# Patient Record
Sex: FEMALE | Race: WHITE | Employment: FULL TIME | URBAN - METROPOLITAN AREA
[De-identification: names, ages, dates, MRNs, and addresses within clinical notes are randomized per-mention and may not be internally consistent; named-entity substitution may affect disease eponyms.]

---

## 2017-05-08 ENCOUNTER — TRANSFERRED RECORDS (OUTPATIENT)
Dept: HEALTH INFORMATION MANAGEMENT | Facility: CLINIC | Age: 62
End: 2017-05-08

## 2017-08-05 ENCOUNTER — HEALTH MAINTENANCE LETTER (OUTPATIENT)
Age: 62
End: 2017-08-05

## 2017-11-20 ENCOUNTER — TRANSFERRED RECORDS (OUTPATIENT)
Dept: HEALTH INFORMATION MANAGEMENT | Facility: CLINIC | Age: 62
End: 2017-11-20

## 2018-10-06 ENCOUNTER — HEALTH MAINTENANCE LETTER (OUTPATIENT)
Age: 63
End: 2018-10-06

## 2018-12-10 ENCOUNTER — TRANSFERRED RECORDS (OUTPATIENT)
Dept: HEALTH INFORMATION MANAGEMENT | Facility: CLINIC | Age: 63
End: 2018-12-10

## 2019-06-17 ENCOUNTER — TRANSFERRED RECORDS (OUTPATIENT)
Dept: HEALTH INFORMATION MANAGEMENT | Facility: CLINIC | Age: 64
End: 2019-06-17

## 2019-10-02 ENCOUNTER — HEALTH MAINTENANCE LETTER (OUTPATIENT)
Age: 64
End: 2019-10-02

## 2019-11-05 ENCOUNTER — THERAPY VISIT (OUTPATIENT)
Dept: PHYSICAL THERAPY | Facility: CLINIC | Age: 64
End: 2019-11-05
Payer: COMMERCIAL

## 2019-11-05 DIAGNOSIS — M54.50 LEFT-SIDED LOW BACK PAIN WITHOUT SCIATICA: ICD-10-CM

## 2019-11-05 PROCEDURE — 97112 NEUROMUSCULAR REEDUCATION: CPT | Mod: GP | Performed by: PHYSICAL THERAPIST

## 2019-11-05 PROCEDURE — 97161 PT EVAL LOW COMPLEX 20 MIN: CPT | Mod: GP | Performed by: PHYSICAL THERAPIST

## 2019-11-05 PROCEDURE — 97110 THERAPEUTIC EXERCISES: CPT | Mod: GP | Performed by: PHYSICAL THERAPIST

## 2019-11-05 ASSESSMENT — ACTIVITIES OF DAILY LIVING (ADL)
SITTING_FOR_15_MINUTES: NO DIFFICULTY AT ALL
GOING_DOWN_1_FLIGHT_OF_STAIRS: NO DIFFICULTY AT ALL
STANDING_FOR_15_MINUTES: SLIGHT DIFFICULTY
RECREATIONAL_ACTIVITIES: SLIGHT DIFFICULTY
WALKING_INITIALLY: NO DIFFICULTY AT ALL
WALKING_15_MINUTES_OR_GREATER: SLIGHT DIFFICULTY
HOS_ADL_SCORE(%): 91.67
WALKING_APPROXIMATELY_10_MINUTES: NO DIFFICULTY AT ALL
LIGHT_TO_MODERATE_WORK: NO DIFFICULTY AT ALL
GETTING_INTO_AND_OUT_OF_AN_AVERAGE_CAR: NO DIFFICULTY AT ALL
GETTING_INTO_AND_OUT_OF_A_BATHTUB: NO DIFFICULTY AT ALL
ROLLING_OVER_IN_BED: NO DIFFICULTY AT ALL
HEAVY_WORK: SLIGHT DIFFICULTY
HOS_ADL_COUNT: 15
HOS_ADL_HIGHEST_POTENTIAL_SCORE: 60
TWISTING/PIVOTING_ON_INVOLVED_LEG: NO DIFFICULTY AT ALL
GOING_UP_1_FLIGHT_OF_STAIRS: NO DIFFICULTY AT ALL
HOS_ADL_ITEM_SCORE_TOTAL: 55
HOW_WOULD_YOU_RATE_YOUR_CURRENT_LEVEL_OF_FUNCTION_DURING_YOUR_USUAL_ACTIVITIES_OF_DAILY_LIVING_FROM_0_TO_100_WITH_100_BEING_YOUR_LEVEL_OF_FUNCTION_PRIOR_TO_YOUR_HIP_PROBLEM_AND_0_BEING_THE_INABILITY_TO_PERFORM_ANY_OF_YOUR_USUAL_DAILY_ACTIVITIES?: 90
DEEP_SQUATTING: SLIGHT DIFFICULTY
STEPPING_UP_AND_DOWN_CURBS: NO DIFFICULTY AT ALL
PUTTING_ON_SOCKS_AND_SHOES: NO DIFFICULTY AT ALL

## 2019-11-05 NOTE — LETTER
[facility] ATHLETIC [facility] PHYSICAL [facility]  [address] [address]  [phone]    2019    Re: Shahnaz Denson   :   1955  MRN:  [id number]   REFERRING PHYSICIAN:   OLIVIA Swanson CNP    [facility] ATHLETIC [facility] PHYSICAL [facility]    Date of Initial Evaluation:  19  Visits:  Rxs Used: 1  Reason for Referral:  Left-sided low back pain without sciatica        [facility] Initial Evaluation    Subjective:  Pt presents to PT with a chief complaint of L sided gluteal pain with reported onset in 2019. Pain worsened considerably with prolonged ambulation while on vacation in 10/2019. Primary pain location identified at R gluteals with radiation into R groin. Pt denies any radiating distal sxs or any numbness/tingling.     Shahnaz Denson being seen for low back and left hip/buttock pain.   Problem began 2019. Where condition occurred: for unknown reasons.Problem occurred: unknown  and reported as 2/10 on pain scale. General health as reported by patient is good. Pertinent medical history includes:  Thyroid problems.    Surgeries include:  Cancer surgery.  Current medications:  Thyroid medication.   Primary job tasks include:  Computer work and prolonged sitting.  Pain is described as aching and burning and is intermittent. Pain is the same all the time. Since onset symptoms are unchanged. Patient is . Restrictions include:  Working in normal job without restrictions.    Barriers include:  None as reported by patient.    Type of problem:  Lumbar (L sided)   Condition occurred with:  Insidious onset. This is a new condition   Problem details: 2019.   Patient reports pain:  Lower lumbar spine and lumbar spine left. Radiates to:  Gluteals left (groin). Associated symptoms:  Loss of motion/stiffness. Symptoms are exacerbated by standing, sitting and walking and relieved by rest and heat.    Lumbar/SI  Evaluation  ROM:    AROM Lumbar:   Flexion:           WNL  Ext:                    Min loss   Side Bend:        Left:  Min loss, pain +    Right:   Rotation:           Left:  WNL    Right:  WNL  Side Glide:        Left:     Right:   Re: Shahnaznayan Marteock   :   1955            Lumbar Myotomes:  normal    Lumbar Dermtomes:  normal    Neural Tension/Mobility:  Lumbar:  Normal      Lumbar Palpation:    Tenderness present at Left:    Piriformis    Spinal Segmental Conclusions:   Level: Hypo noted at L4 and L5    SI joint/Sacrum:      Left positive at:    Thigh thrust and Sacral thrust      Hip Evaluation    Hip Strength:    Extension:  Left: 5-/5  Pain:Right: 5-/5    Pain:    Abduction:  Left: 4+/5     Pain:Right: 5-/5    Pain:  Internal Rotation:  Left: 5/5    Pain:Right: 5/5   Pain:  External Rotation:  Left: 4+/5   Pain:  Right: 5-/5   Pain:    Assessment/Plan:    Patient is a 64 year old female with lumbar complaints.    Patient has the following significant findings with corresponding treatment plan.                Diagnosis 1:  L sided LBP  Pain -  manual therapy, splint/taping/bracing/orthotics, self management and education  Decreased ROM/flexibility - manual therapy and therapeutic exercise  Decreased joint mobility - manual therapy and therapeutic exercise  Decreased strength - therapeutic exercise and therapeutic activities  Impaired muscle performance - neuro re-education  Impaired posture - neuro re-education    Therapy Evaluation Codes:     Cumulative Therapy Evaluation is: Low complexity.  Previous and current functional limitations:  (See Goal Flow Sheet for this information)    Short term and Long term goals: (See Goal Flow Sheet for this information)     Communication ability:  Patient appears to be able to clearly communicate and understand verbal and written communication and follow directions correctly.  Treatment Explanation - The following has been discussed with the patient:   RX ordered/plan  of care  Anticipated outcomes  Possible risks and side effects  This patient would benefit from PT intervention to resume normal activities.   Rehab potential is good.  Re: Shahnaz Denson   :   1955        Frequency:  1 X week, once daily  Duration:  for 6 weeks  Discharge Plan:  Achieve all LTG.  Independent in home treatment program.  Reach maximal therapeutic benefit.    Please refer to the daily flowsheet for treatment today, total treatment time and time spent performing 1:1 timed codes.       Thank you for your referral.    INQUIRIES  Therapist: Boo Mclain DPT   [facility] PHYSICAL THERAPY  [address] [place] [address]  Phone: [phone]  Fax: [phone]

## 2019-11-05 NOTE — PROGRESS NOTES
[facility] Initial Evaluation  Subjective:  Pt presents to PT with a chief complaint of L sided gluteal pain with reported onset in 08/2019. Pain worsened considerably with prolonged ambulation while on vacation in 10/2019. Primary pain location identified at R gluteals with radiation into R groin. Pt denies any radiating distal sxs or any numbness/tingling.     Shahnaz Denson being seen for low back and left hip/buttock pain.   Problem began 8/1/2019. Where condition occurred: for unknown reasons.Problem occurred: unknown  and reported as 2/10 on pain scale. General health as reported by patient is good. Pertinent medical history includes:  Thyroid problems.    Surgeries include:  Cancer surgery.  Current medications:  Thyroid medication.   Primary job tasks include:  Computer work and prolonged sitting.  Pain is described as aching and burning and is intermittent. Pain is the same all the time. Since onset symptoms are unchanged.      Patient is . Restrictions include:  Working in normal job without restrictions.    Barriers include:  None as reported by patient.    Type of problem:  Lumbar (L sided)   Condition occurred with:  Insidious onset. This is a new condition   Problem details: 08/2019.   Patient reports pain:  Lower lumbar spine and lumbar spine left. Radiates to:  Gluteals left (groin). Associated symptoms:  Loss of motion/stiffness. Symptoms are exacerbated by standing, sitting and walking and relieved by rest and heat.                      Objective:  System         Lumbar/SI Evaluation  ROM:    AROM Lumbar:   Flexion:           WNL  Ext:                    Min loss   Side Bend:        Left:  Min loss, pain +    Right:   Rotation:           Left:  WNL    Right:  WNL  Side Glide:        Left:     Right:           Lumbar Myotomes:  normal                Lumbar Dermtomes:  normal                Neural Tension/Mobility:  Lumbar:  Normal        Lumbar Palpation:    Tenderness  present at Left:    Piriformis        Spinal Segmental Conclusions:     Level: Hypo noted at L4 and L5      SI joint/Sacrum:        Left positive at:    Thigh thrust and Sacral thrust                                        Hip Evaluation    Hip Strength:      Extension:  Left: 5-/5  Pain:Right: 5-/5    Pain:    Abduction:  Left: 4+/5     Pain:Right: 5-/5    Pain:    Internal Rotation:  Left: 5/5    Pain:Right: 5/5   Pain:  External Rotation:  Left: 4+/5   Pain:  Right: 5-/5   Pain:                           General     ROS    Assessment/Plan:    Patient is a 64 year old female with lumbar complaints.    Patient has the following significant findings with corresponding treatment plan.                Diagnosis 1:  L sided LBP  Pain -  manual therapy, splint/taping/bracing/orthotics, self management and education  Decreased ROM/flexibility - manual therapy and therapeutic exercise  Decreased joint mobility - manual therapy and therapeutic exercise  Decreased strength - therapeutic exercise and therapeutic activities  Impaired muscle performance - neuro re-education  Impaired posture - neuro re-education    Therapy Evaluation Codes:     Cumulative Therapy Evaluation is: Low complexity.    Previous and current functional limitations:  (See Goal Flow Sheet for this information)    Short term and Long term goals: (See Goal Flow Sheet for this information)     Communication ability:  Patient appears to be able to clearly communicate and understand verbal and written communication and follow directions correctly.  Treatment Explanation - The following has been discussed with the patient:   RX ordered/plan of care  Anticipated outcomes  Possible risks and side effects  This patient would benefit from PT intervention to resume normal activities.   Rehab potential is good.    Frequency:  1 X week, once daily  Duration:  for 6 weeks  Discharge Plan:  Achieve all LTG.  Independent in home treatment program.  Reach maximal  therapeutic benefit.    Please refer to the daily flowsheet for treatment today, total treatment time and time spent performing 1:1 timed codes.

## 2019-11-19 ENCOUNTER — THERAPY VISIT (OUTPATIENT)
Dept: PHYSICAL THERAPY | Facility: CLINIC | Age: 64
End: 2019-11-19
Payer: COMMERCIAL

## 2019-11-19 DIAGNOSIS — M54.50 LEFT-SIDED LOW BACK PAIN WITHOUT SCIATICA: ICD-10-CM

## 2019-11-19 PROCEDURE — 97112 NEUROMUSCULAR REEDUCATION: CPT | Mod: GP | Performed by: PHYSICAL THERAPIST

## 2019-11-19 PROCEDURE — 97110 THERAPEUTIC EXERCISES: CPT | Mod: GP | Performed by: PHYSICAL THERAPIST

## 2019-12-13 ENCOUNTER — THERAPY VISIT (OUTPATIENT)
Dept: PHYSICAL THERAPY | Facility: CLINIC | Age: 64
End: 2019-12-13
Payer: COMMERCIAL

## 2019-12-13 DIAGNOSIS — M54.50 LEFT-SIDED LOW BACK PAIN WITHOUT SCIATICA: ICD-10-CM

## 2019-12-13 PROCEDURE — 97112 NEUROMUSCULAR REEDUCATION: CPT | Mod: GP | Performed by: PHYSICAL THERAPIST

## 2019-12-13 PROCEDURE — 97110 THERAPEUTIC EXERCISES: CPT | Mod: GP | Performed by: PHYSICAL THERAPIST

## 2019-12-13 NOTE — LETTER
[facility] ATHLETIC [facility] PHYSICAL THERAPY  [address] [place] [address]  [phone]    2019    Re: Shahnaz Denson   :   1955  MRN:  [id number]   REFERRING PHYSICIAN:   OLIVIA Swanson CNP    [facility] ATHLETIC [facility] PHYSICAL THERAPY    Date of Initial Evaluation:  /  Visits:  Rxs Used: 3  Reason for Referral:  Left-sided low back pain without sciatica        PROGRESS  REPORT    Progress reporting period is from 19 to 19. Pt has attended 3 PT sessions addressing patient's chief complaints of L sided gluteal pain with reported onset in 2019. Pain worsened considerably with prolonged ambulation while on vacation in 10/2019. Primary pain location identified at R gluteals with radiation into R groin.     Pt reports considerable improvement throughout PT, reporting ~80% improvement with tx consisting of gluteal strengthening, lumbar flexion ROM, and initial core stabilization. Pt to cont HEP independently     SUBJECTIVE  Subjective: Pt reports doing well overall and feels ~80% improved since the onset of PT. Still has some localized tenderness at her L SI joint but much improved overall. Able to play hockey this week w/o increased pain.     Current Pain level: 0/10.     Initial Pain level: 5/10.   Changes in function:  Yes (See Goal flowsheet attached for changes in current functional level)  Adverse reaction to treatment or activity: None    OBJECTIVE  Changes noted in objective findings:  Yes,   Painfree WFL LUmbar AROM flexion, extension, and rotation.   Mild soreness with Left sidebending.   Pain with thigh thrust and palpation of L PSIS.   Painfree resisted hip Abduction 5/5 and extension 4+/5.     Good technique for HEP but requires continued core and gluteal strengthening w/ independent HEP           Re: Shahnaz Denson   :   1955          ASSESSMENT/PLAN  Updated problem list and treatment plan: Diagnosis  1:  L sided LBP    STG/LTGs have been met or progress has been made towards goals:  Yes (See Goal flow sheet completed today.)  Assessment of Progress: The patient's condition is improving.  Self Management Plans:  Patient has been instructed in a home treatment program.  Patient is independent in a home treatment program.  I have re-evaluated this patient and find that the nature, scope, duration and intensity of the therapy is appropriate for the medical condition of the patient.  Shahnaz continues to require the following intervention to meet STG and LTG's:  PT intervention is no longer required to meet STG/LTG.    Recommendations:  This patient is ready to be discharged from therapy and continue their home treatment program.    Please refer to the daily flowsheet for treatment today, total treatment time and time spent performing 1:1 timed codes.        Thank you for your referral.    INQUIRIES  Therapist: Boo Mclain DPT  [facility] PHYSICAL THERAPY  [address] [place] [address]  Phone: [phone]  Fax: [phone]

## 2019-12-13 NOTE — PROGRESS NOTES
PROGRESS  REPORT    Progress reporting period is from 11/5/19 to 12/13/19. Pt has attended 3 PT sessions addressing patient's chief complaints of L sided gluteal pain with reported onset in 08/2019. Pain worsened considerably with prolonged ambulation while on vacation in 10/2019. Primary pain location identified at R gluteals with radiation into R groin.     Pt reports considerable improvement throughout PT, reporting ~80% improvement with tx consisting of gluteal strengthening, lumbar flexion ROM, and initial core stabilization. Pt to cont HEP independently     SUBJECTIVE  Subjective: Pt reports doing well overall and feels ~80% improved since the onset of PT. Still has some localized tenderness at her L SI joint but much improved overall. Able to play hockey this week w/o increased pain.     Current Pain level: 0/10.     Initial Pain level: 5/10.   Changes in function:  Yes (See Goal flowsheet attached for changes in current functional level)  Adverse reaction to treatment or activity: None    OBJECTIVE  Changes noted in objective findings:  Yes,   Painfree WFL LUmbar AROM flexion, extension, and rotation.   Mild soreness with Left sidebending.   Pain with thigh thrust and palpation of L PSIS.   Painfree resisted hip Abduction 5/5 and extension 4+/5.     Good technique for HEP but requires continued core and gluteal strengthening w/ independent HEP     ASSESSMENT/PLAN  Updated problem list and treatment plan: Diagnosis 1:  L sided LBP    STG/LTGs have been met or progress has been made towards goals:  Yes (See Goal flow sheet completed today.)  Assessment of Progress: The patient's condition is improving.  Self Management Plans:  Patient has been instructed in a home treatment program.  Patient is independent in a home treatment program.  I have re-evaluated this patient and find that the nature, scope, duration and intensity of the therapy is appropriate for the medical condition of the patient.  Shahnaz continues  to require the following intervention to meet STG and LTG's:  PT intervention is no longer required to meet STG/LTG.    Recommendations:  This patient is ready to be discharged from therapy and continue their home treatment program.    Please refer to the daily flowsheet for treatment today, total treatment time and time spent performing 1:1 timed codes.

## 2020-01-25 ENCOUNTER — OFFICE VISIT (OUTPATIENT)
Dept: URGENT CARE | Facility: URGENT CARE | Age: 65
End: 2020-01-25
Payer: COMMERCIAL

## 2020-01-25 ENCOUNTER — ANCILLARY PROCEDURE (OUTPATIENT)
Dept: GENERAL RADIOLOGY | Facility: CLINIC | Age: 65
End: 2020-01-25
Attending: INTERNAL MEDICINE
Payer: COMMERCIAL

## 2020-01-25 VITALS
DIASTOLIC BLOOD PRESSURE: 78 MMHG | RESPIRATION RATE: 16 BRPM | HEART RATE: 65 BPM | TEMPERATURE: 98 F | SYSTOLIC BLOOD PRESSURE: 147 MMHG | OXYGEN SATURATION: 98 %

## 2020-01-25 DIAGNOSIS — S69.91XA INJURY OF FINGER OF RIGHT HAND, INITIAL ENCOUNTER: ICD-10-CM

## 2020-01-25 DIAGNOSIS — I10 UNCONTROLLED HYPERTENSION: Primary | ICD-10-CM

## 2020-01-25 PROCEDURE — 99203 OFFICE O/P NEW LOW 30 MIN: CPT | Performed by: INTERNAL MEDICINE

## 2020-01-25 PROCEDURE — 73140 X-RAY EXAM OF FINGER(S): CPT | Mod: RT

## 2020-01-25 ASSESSMENT — ENCOUNTER SYMPTOMS: WOUND: 0

## 2020-01-25 NOTE — PROGRESS NOTES
SUBJECTIVE:   Shahnaz Denson is a 64 year old female presenting with a chief complaint of   Chief Complaint   Patient presents with     Urgent Care     Finger     Pt states that she jammed her right pinky yesterday afternoon states she suffers from arthritis and states now pain is worst        She is a new patient of [facility].    MS Injury/Pain    Onset of symptoms was 1 day(s) ago.  Location: right finger  fifth  Context:       The injury happened while at home      Mechanism: She jammed her finger in the doorway      Patient experienced immediate pain, immediate swelling  Course of symptoms is worsening.    Current and Associated symptoms: Pain, Swelling and Decreased range of motion    Aggravating Factors: hitting on something  Therapies to improve symptoms include: elevation    The patient has a history of osteoarthritis     Review of Systems   Skin: Negative for wound.   Neurological:        No numbness for finger       Past Medical History:   Diagnosis Date     Allergic rhinitis due to other allergen      Arthritis     thumbs/wrist     Disorder of bone and cartilage, unspecified      Esophageal reflux      HYPERTENSION 2007     Hypertension goal BP (blood pressure) < 140/90 10/25/2011     Infiltrating ductal carcinoma of breast (H)     recurrence     Malignant neoplasm of breast (female), unspecified site      Other and unspecified hyperlipidemia      Personal history of other malignant neoplasm of skin     basal cell carcinoma     Thyroid cancer (H)      Family History   Problem Relation Age of Onset     Arthritis Mother      Osteoporosis Mother      C.A.D. Father      Cancer - colorectal Father         dx early 50's     Cerebrovascular Disease Father          from     Colon Cancer Father      Breast Cancer Paternal Aunt      Allergies Sister      Allergies Brother      Prostate Cancer Brother      Diabetes No family hx of      Hypertension No family hx of      Current Outpatient  Medications   Medication Sig Dispense Refill     acyclovir (ZOVIRAX) 400 MG tablet Take 400 mg by mouth 2 times daily.       AROMASIN 25 MG PO TABS 1 TABLET DAILY AFTER A MEAL       CALCIUM 500 + D 500-200 MG-IU OR TABS 1 tablet twice daily 0 0     Cholecalciferol (VITAMIN D) 1000 UNIT capsule Take 1 capsule by mouth daily.       DIGITAL BP MONITOR CUFF MISC monitor blood pressure  1 0     fenofibrate 160 MG tablet Take 1 tablet (160 mg) by mouth daily with food. 90 tablet PRN     levothyroxine (SYNTHROID, LEVOTHROID) 175 MCG tablet Take 175 mcg by mouth daily Pt reports taking 175 MCG every day and then 1/2 (175 MCG) taken one day per week.       loratadine (CLARITIN) 10 MG tablet Take 10 mg by mouth daily       niacin 500 MG CR capsule Take 2 capsules by mouth At Bedtime. 100 capsule 6     Omega-3 Fatty Acids (FISH OIL PO) Take  by mouth daily.       simvastatin (ZOCOR) 40 MG tablet 1 TABLET AT BEDTIME 90 tablet PRN     fluticasone (FLONASE) 50 MCG/ACT nasal spray Spray 2 sprays into both nostrils daily. (Patient not taking: Reported on 1/25/2020) 1 Package 11     Social History     Tobacco Use     Smoking status: Never Smoker     Smokeless tobacco: Never Used   Substance Use Topics     Alcohol use: Yes     Comment: Socially       OBJECTIVE  BP (!) 147/78   Pulse 65   Temp 98  F (36.7  C) (Oral)   Resp 16   SpO2 98%     Physical Exam  Vitals signs reviewed.   Constitutional:       Appearance: Normal appearance.   Musculoskeletal:      Comments: right hand  5th digit - PIP joint swollen with greenish hue  Remaining exam finger/ hand normal    Neurological:      Mental Status: She is alert.         Labs:  No results found for this or any previous visit (from the past 24 hour(s)).    X-Ray was done, my findings are: No acute fracture noted.  There is a bony density in the IP joint that is smooth    ASSESSMENT:      ICD-10-CM    1. Uncontrolled hypertension I10    2. Injury of finger of right hand, initial  encounter S69.91XA XR Finger Right G/E 2 Views     Orthopedic & Spine  Referral          PLAN:    MS Injury/Pain  ice, elevate, rest, splint: , Tylenol and Ibuprofen    Followup:  Ortho pedics if fracture confirmed by radiology   or recheck with PCP with concerns    Results per mychart    Patient Instructions   Xray shows loose white body at PIP joint although doesn't look acute.  Will treat as fracture & refer to Orthopedics if Radiology confirms finding.    Finger splint  Ice, ibuprofen         Addendum.  Reviewed X ray report by radiology and no fracture noted.  The white loose body was felt to be a ossicle.  Patient notified by my chart.  Informed on my chart message that she would not need to see orthopedics

## 2020-01-25 NOTE — PATIENT INSTRUCTIONS
Xray shows loose white body at PIP joint although doesn't look acute.  Will treat as fracture & refer to Orthopedics if Radiology confirms finding.    Finger splint  Ice, ibuprofen

## 2021-01-15 ENCOUNTER — HEALTH MAINTENANCE LETTER (OUTPATIENT)
Age: 66
End: 2021-01-15

## 2021-09-04 ENCOUNTER — HEALTH MAINTENANCE LETTER (OUTPATIENT)
Age: 66
End: 2021-09-04

## 2022-02-19 ENCOUNTER — HEALTH MAINTENANCE LETTER (OUTPATIENT)
Age: 67
End: 2022-02-19

## 2022-10-22 ENCOUNTER — HEALTH MAINTENANCE LETTER (OUTPATIENT)
Age: 67
End: 2022-10-22

## 2023-04-01 ENCOUNTER — HEALTH MAINTENANCE LETTER (OUTPATIENT)
Age: 68
End: 2023-04-01

## 2024-04-08 ENCOUNTER — NURSE TRIAGE (OUTPATIENT)
Dept: FAMILY MEDICINE | Facility: CLINIC | Age: 69
End: 2024-04-08

## 2024-04-08 NOTE — TELEPHONE ENCOUNTER
"Nurse Triage SBAR    Is this a 2nd Level Triage? NO    Situation: nosebleed x1 hour, heavy    Background: Patient without history of recurrent nosebleeds.     Assessment: Patient on Eliquis, has nosebleed for over 1 hour. Passed at least 1 large blood clot. Patient has been appropriately holding pressure to nostrils.     Protocol Recommended Disposition:   Go To ED/UCC Now (Or To Office With PCP Approval)    Recommendation: Go to ER     Does the patient meet one of the following criteria for ADS visit consideration? No    Reason for Disposition   Bleeding present > 30 minutes and using correct method of direct pressure    Additional Information   Negative: Fainted (passed out), or too weak to stand following large blood loss   Negative: Sounds like a life-threatening emergency to the triager   Negative: Nosebleed followed nose injury    Answer Assessment - Initial Assessment Questions  1. AMOUNT OF BLEEDING: \"How bad is the bleeding?\" \"How much blood was lost?\" \"Has the bleeding stopped?\"    - MILD: needed a couple tissues    - MODERATE: needed many tissues    - SEVERE: large blood clots, soaked many tissues, lasted more than 30 minutes       Severe  2. ONSET: \"When did the nosebleed start?\"       About 1 hour ago  3. FREQUENCY: \"How many nosebleeds have you had in the last 24 hours?\"       1  4. RECURRENT SYMPTOMS: \"Have there been other recent nosebleeds?\" If Yes, ask: \"How long did it take you to stop the bleeding?\" \"What worked best?\"       Bleeding is still going on, has seen at least 1 blood clot, large  5. CAUSE: \"What do you think caused this nosebleed?\"      She is on Eliquis, does not know why it started though  6. LOCAL FACTORS: \"Do you have any cold symptoms?\", \"Have you been rubbing or picking at your nose?\"      no  7. SYSTEMIC FACTORS: \"Do you have high blood pressure or any bleeding problems?\"      Atrial fibrillation, on Eliquis  8. BLOOD THINNERS: \"Do you take any blood thinners?\" (e.g., aspirin, " "clopidogrel / Plavix, coumadin, heparin). Notes: Other strong blood thinners include: Arixtra (fondaparinux), Eliquis (apixaban), Pradaxa (dabigatran), and Xarelto (rivaroxaban).      Yes, Eliquis  9. OTHER SYMPTOMS: \"Do you have any other symptoms?\" (e.g., lightheadedness)      no  10. PREGNANCY: \"Is there any chance you are pregnant?\" \"When was your last menstrual period?\"        NA    Protocols used: Nosebleed-A-OH    "

## 2024-06-02 ENCOUNTER — HEALTH MAINTENANCE LETTER (OUTPATIENT)
Age: 69
End: 2024-06-02